# Patient Record
Sex: MALE | Race: WHITE | HISPANIC OR LATINO | Employment: FULL TIME | URBAN - METROPOLITAN AREA
[De-identification: names, ages, dates, MRNs, and addresses within clinical notes are randomized per-mention and may not be internally consistent; named-entity substitution may affect disease eponyms.]

---

## 2023-10-23 ENCOUNTER — OFFICE VISIT (OUTPATIENT)
Dept: PRIMARY CARE | Facility: CLINIC | Age: 33
End: 2023-10-23
Payer: COMMERCIAL

## 2023-10-23 VITALS
OXYGEN SATURATION: 94 % | BODY MASS INDEX: 31.03 KG/M2 | DIASTOLIC BLOOD PRESSURE: 68 MMHG | HEIGHT: 67 IN | SYSTOLIC BLOOD PRESSURE: 132 MMHG | HEART RATE: 92 BPM | WEIGHT: 197.7 LBS

## 2023-10-23 DIAGNOSIS — N50.89 MASS OF LEFT TESTICLE: Primary | ICD-10-CM

## 2023-10-23 PROCEDURE — 99203 OFFICE O/P NEW LOW 30 MIN: CPT | Performed by: PHYSICIAN ASSISTANT

## 2023-10-23 ASSESSMENT — PATIENT HEALTH QUESTIONNAIRE - PHQ9
2. FEELING DOWN, DEPRESSED OR HOPELESS: NOT AT ALL
SUM OF ALL RESPONSES TO PHQ9 QUESTIONS 1 AND 2: 0
1. LITTLE INTEREST OR PLEASURE IN DOING THINGS: NOT AT ALL

## 2023-10-23 ASSESSMENT — PAIN SCALES - GENERAL: PAINLEVEL: 8

## 2023-10-23 NOTE — PROGRESS NOTES
"Subjective   Patient ID: Doe Joyce is a 32 y.o. male who presents for Testicle Pain.    C/o testicular pain on L side for couple months. Denies injury or urinary symptoms.     Testicle Pain  The patient's primary symptoms include testicular pain.        Review of Systems   Genitourinary:  Positive for testicular pain.   All other systems reviewed and are negative.      Objective   /68   Pulse 92   Ht 1.702 m (5' 7\")   Wt 89.7 kg (197 lb 11.2 oz)   SpO2 94%   BMI 30.96 kg/m²     Physical Exam  Constitutional:       Appearance: Normal appearance.   Genitourinary:     Comments: L testicular cyst vs varicocele; mild tenderness        Assessment/Plan   Diagnoses and all orders for this visit:  Mass of left testicle  -     US scrotum; Future         "

## 2023-10-25 ENCOUNTER — HOSPITAL ENCOUNTER (OUTPATIENT)
Dept: RADIOLOGY | Facility: HOSPITAL | Age: 33
Discharge: HOME | End: 2023-10-25
Payer: COMMERCIAL

## 2023-10-25 DIAGNOSIS — N50.89 MASS OF LEFT TESTICLE: ICD-10-CM

## 2023-10-25 PROCEDURE — 76870 US EXAM SCROTUM: CPT

## 2023-11-13 ENCOUNTER — TELEPHONE (OUTPATIENT)
Dept: PRIMARY CARE | Facility: CLINIC | Age: 33
End: 2023-11-13
Payer: COMMERCIAL

## 2023-11-13 ENCOUNTER — APPOINTMENT (OUTPATIENT)
Dept: PRIMARY CARE | Facility: CLINIC | Age: 33
End: 2023-11-13
Payer: COMMERCIAL

## 2023-11-13 NOTE — TELEPHONE ENCOUNTER
Called and spoke with pt, who understands results. Pt would like to see urologist, please place referral. Pt has cancelled appt for today with [facility].

## 2024-09-18 ENCOUNTER — OFFICE VISIT (OUTPATIENT)
Dept: URGENT CARE | Age: 34
End: 2024-09-18
Payer: COMMERCIAL

## 2024-09-18 VITALS
HEIGHT: 67 IN | RESPIRATION RATE: 18 BRPM | SYSTOLIC BLOOD PRESSURE: 125 MMHG | HEART RATE: 87 BPM | OXYGEN SATURATION: 97 % | BODY MASS INDEX: 28.72 KG/M2 | DIASTOLIC BLOOD PRESSURE: 77 MMHG | TEMPERATURE: 98 F | WEIGHT: 183 LBS

## 2024-09-18 DIAGNOSIS — S39.012A STRAIN OF LUMBAR REGION, INITIAL ENCOUNTER: Primary | ICD-10-CM

## 2024-09-18 RX ORDER — PREDNISONE 50 MG/1
50 TABLET ORAL DAILY
Qty: 5 TABLET | Refills: 0 | Status: SHIPPED | OUTPATIENT
Start: 2024-09-18 | End: 2024-09-23

## 2024-09-18 RX ORDER — CYCLOBENZAPRINE HCL 10 MG
5 TABLET ORAL NIGHTLY PRN
Qty: 20 TABLET | Refills: 0 | Status: SHIPPED | OUTPATIENT
Start: 2024-09-18

## 2024-09-18 NOTE — PROGRESS NOTES
Chief Complaint   Patient presents with    Back Pain     Pt states he has pain in his lower back for over 1x wk       Physical Exam:     No midline tenderness or bony step offs. Tender to palpation along the Right lumbar paraspinal musculature. Moderate spasm noted. No ecchymosis or erythema. ROM is unlimited, \\        Encounter Diagnosis   Name Primary?    Strain of lumbar region, initial encounter Yes          Plan:   For pain:   -Rx: prednisone   -Rx Flexeril  -Take 1000 mg of Tylenol   -lidocaine patches     -Rest, Ice (20 min on 20 min off for first 48 hours then change to heat), Compression (For comfort/to reduce swelling), Elevation (Helps reduce swelling).    Geri Milian, DO

## 2024-09-18 NOTE — LETTER
September 18, 2024     Patient: Doe Joyce   YOB: 1990   Date of Visit: 9/18/2024       To Whom It May Concern:    Doe Joyce was seen in my clinic on 9/18/2024 at 10:25 am. Please excuse Doe for his absence from work on this day to make the appointment.    If you have any questions or concerns, please don't hesitate to call.         Sincerely,         Geri Milian,         CC: No Recipients